# Patient Record
Sex: FEMALE | Race: OTHER | URBAN - METROPOLITAN AREA
[De-identification: names, ages, dates, MRNs, and addresses within clinical notes are randomized per-mention and may not be internally consistent; named-entity substitution may affect disease eponyms.]

---
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
## 2024-03-14 ENCOUNTER — HOSPITAL ENCOUNTER (OUTPATIENT)
Age: 19
Discharge: HOME | End: 2024-03-14
Payer: MEDICAID

## 2024-03-14 VITALS
SYSTOLIC BLOOD PRESSURE: 128 MMHG | DIASTOLIC BLOOD PRESSURE: 80 MMHG | OXYGEN SATURATION: 96 % | HEART RATE: 108 BPM | RESPIRATION RATE: 20 BRPM | TEMPERATURE: 101 F

## 2024-03-14 DIAGNOSIS — J10.1 INFLUENZA A: Primary | ICD-10-CM

## 2024-03-14 LAB
POCT INFLUENZA A: POSITIVE
POCT INFLUENZA B: NEGATIVE
SARS-COV-2 RNA RESP QL NAA+PROBE: NOT DETECTED

## 2024-03-14 RX ORDER — ACETAMINOPHEN 325 MG/1
650 TABLET ORAL ONCE
Status: COMPLETED | OUTPATIENT
Start: 2024-03-14 | End: 2024-03-14

## 2024-03-14 RX ORDER — OSELTAMIVIR PHOSPHATE 75 MG/1
75 CAPSULE ORAL 2 TIMES DAILY
Qty: 10 CAPSULE | Refills: 0 | Status: SHIPPED | OUTPATIENT
Start: 2024-03-14 | End: 2024-03-19

## 2024-03-14 NOTE — ED PROVIDER NOTES
Chief Complaint   Patient presents with    Cough/URI       HPI:     Jess Lamb is a 18 year old female who presents patient of congestion cough and mild sore throat onset yesterday.  Notes took a cough medication over-the-counter this afternoon without antipyretics, febrile on arrival, agreeable to Tylenol.  Include coronavirus and influenza within the household.  Denies recent history of self including strep.  Denies recent antibiotics.  Notes mild frontal headache, 2 out of 10, denies associated ear pain dizziness dysphagia neck pain chest pain shortness of breath abdominal pain vomiting diarrhea dysuria or rash.  Tolerating p.o. okay.      PFSH    PFS asessment screens reviewed and agree.  Nurses notes reviewed I agree with documentation.    No family history on file.  Family history reviewed with patient/caregiver and is not pertinent to presenting problem.  Social History     Socioeconomic History    Marital status: Single     Spouse name: Not on file    Number of children: Not on file    Years of education: Not on file    Highest education level: Not on file   Occupational History    Not on file   Tobacco Use    Smoking status: Never    Smokeless tobacco: Never   Substance and Sexual Activity    Alcohol use: Never    Drug use: Never    Sexual activity: Not on file   Other Topics Concern    Not on file   Social History Narrative    Not on file     Social Determinants of Health     Financial Resource Strain: Not on file   Food Insecurity: Not on file   Transportation Needs: Not on file   Physical Activity: Not on file   Stress: Not on file   Social Connections: Not on file   Housing Stability: Not on file         ROS:   Positive for stated complaint: throat pain  All other systems reviewed and negative except as noted above.  Constitutional and Vital Signs Reviewed.      Physical Exam:     Findings:    /80   Pulse 108   Temp (!) 100.5 °F (38.1 °C) (Temporal)   Resp 20   SpO2 96%   GENERAL: well  developed, well nourished, well hydrated, no distress  SKIN: good skin turgor, no obvious rashes  NECK: No nuchal rigidity. supple, no adenopathy  EXTREMITIES: no cyanosis or edema. KAUFMAN without difficulty  GI: soft, non-tender, normal bowel sounds  HEAD: normocephalic, atraumatic  EYES: sclera non icteric bilateral, conjunctiva clear  EARS: TMs clear bilaterally. Canals clear.  NOSE: Rhinorrhea. MMM. nasal turbinates: pink, normal mucosa  THROAT: clear, without exudates, uvula midline, and airway patent  LUNGS: No retractions. clear to auscultation bilaterally; no rales, rhonchi, or wheezes  NEURO: No focal deficits  PSYCH: Alert and oriented x3.  Answering questions appropriately.  Mood appropriate.    MDM/Assessment/Plan:   Orders for this encounter:    Orders Placed This Encounter    POCT Flu Test     Order Specific Question:   Release to patient     Answer:   Immediate    Rapid SARS-CoV-2 by PCR     Order Specific Question:   Release to patient     Answer:   Immediate    acetaminophen (Tylenol) tab 650 mg    oseltamivir (TAMIFLU) 75 MG Oral Cap     Sig: Take 1 capsule (75 mg total) by mouth 2 (two) times daily for 5 days.     Dispense:  10 capsule     Refill:  0       Labs performed this visit:  Recent Results (from the past 10 hour(s))   POCT Flu Test    Collection Time: 03/14/24  6:07 PM    Specimen: Nares; Other   Result Value Ref Range    POCT INFLUENZA A Positive (A) Negative    POCT INFLUENZA B Negative Negative   Rapid SARS-CoV-2 by PCR    Collection Time: 03/14/24  6:07 PM    Specimen: Nares; Other   Result Value Ref Range    Rapid SARS-CoV-2 by PCR Not Detected Not Detected       MDM:  Influenza A positive.  Patient and mother consideration for Tamiflu based on duration of symptoms and history, also instructed on potential side effect profile including vomiting.  Patient requesting medications yet will hold medications if develop vomiting based on reaction or condition.  Will continue fever reducers as  recommended with Tylenol provided prior to disposition.  Work note provided.  Educated home care follow-up as well as indications to return versus go to the ER.    Diagnosis:    ICD-10-CM    1. Influenza A  J10.1           All results reviewed and discussed with patient.  See AVS for detailed discharge instructions for your condition today.    Follow Up with:  Tin Doll MD  [address] [place] [address]  [phone]    Schedule an appointment as soon as possible for a visit in 3 days  As needed, If symptoms worsen

## (undated) NOTE — LETTER
Date & Time: 3/14/2024, 6:26 PM  Patient: Jess Lamb  Encounter Provider(s):    Marcell Moss PA       To Whom It May Concern:    Jess Lamb was seen and treated in our department on 3/14/2024. She should not return to work until Monday  .    If you have any questions or concerns, please do not hesitate to call.      Marcell Moss   _____________________________  Physician/APC Signature